# Patient Record
Sex: FEMALE | Race: WHITE | NOT HISPANIC OR LATINO | URBAN - NONMETROPOLITAN AREA
[De-identification: names, ages, dates, MRNs, and addresses within clinical notes are randomized per-mention and may not be internally consistent; named-entity substitution may affect disease eponyms.]

---

## 2019-04-02 ENCOUNTER — IMPORTED ENCOUNTER (OUTPATIENT)
Dept: URBAN - NONMETROPOLITAN AREA CLINIC 1 | Facility: CLINIC | Age: 29
End: 2019-04-02

## 2019-04-02 PROBLEM — H52.223: Noted: 2019-04-02

## 2019-04-02 PROBLEM — H10.413: Noted: 2019-04-02

## 2019-04-02 PROCEDURE — 92004 COMPRE OPH EXAM NEW PT 1/>: CPT

## 2019-04-02 PROCEDURE — 92015 DETERMINE REFRACTIVE STATE: CPT

## 2019-04-02 NOTE — PATIENT DISCUSSION
Asigmatism / Estel Мария / Nikita Caputo - Discussed diagnosis in detail with patient - New glasses Rx given today- Optos done today WNL OU - Continue to monitor- RTC 1 year complete Allergies OU- Discussed diagnosis in detail with patient- Start Pataday BID OU Rx sent to pharmacy - Continue to monitor

## 2021-05-28 NOTE — PATIENT DISCUSSION
New Prescription: erythromycin (erythromycin): ointment: 5 mg/gram (0.5 %) a small amount three times a day into left eye 05-

## 2021-06-01 NOTE — PATIENT DISCUSSION
Stopped Today: erythromycin (erythromycin): ointment: 5 mg/gram (0.5 %) a small amount three times a day into left eye 05-

## 2021-06-01 NOTE — PATIENT DISCUSSION
CORNEAL FOREIGN BODY OS: EDUCATED PATIENT ON FINDINGS. GREATLY IMPROVED TODAY WITH PINPOINT EPI DEFECT AND MILD RUST RING REMAINING. OK TO DISCONTINUE ERYTHROMYCIN CLAUDIA 2/2 PATIENT IRRITATION. CONTINUE OFLOXACIN QID OS AND PF ARTIFICIAL TEARS QID/PRN OS. DISCUSSED MONITORING RUST RING TO BECOME MORE SUPERFICIAL; PATIENT EXPRESSED UNDERSTANDING. RTC 2 DAYS FOR FOLLOW-UP OR SOONER WITH ANY WORSENING OF SI/SX. MONITOR.

## 2021-06-03 NOTE — PATIENT DISCUSSION
CORNEAL FOREIGN BODY OS: SUPERFICIAL RUST RING REMOVED IN OFFICE TODAY WITH DAFNE BRUSH WITHOUT COMPLICATIONS. SMALL EPI DEFECT REMAINS. CONTINUE OFLOXACIN QID OS (REFILLED TODAY) AND PF ARTIFICIAL TEARS QID/PRN OS. RTC 4-5 DAYS FOR FOLLOW-UP OR SOONER WITH ANY WORSENING OF SI/SX. MONITOR.

## 2021-06-08 NOTE — PATIENT DISCUSSION
CORNEAL FOREIGN BODY OS: EDUCATED PATIENT ON FINDINGS. RESOLVED INTO STROMAL SCAR TODAY WITHOUT EPI DEFECT/RUST RING. OK TO DISCONTINUE OFLOXACIN. CONTINUE ARTIFICIAL TEARS BID/PRN OU. ADVISED TO RTC IF SI/SX RETURN. MONITOR.

## 2022-04-09 ASSESSMENT — TONOMETRY
OD_IOP_MMHG: 13
OS_IOP_MMHG: 14

## 2022-04-09 ASSESSMENT — VISUAL ACUITY
OU_SC: 20/20
OD_CC: 20/30
OS_CC: 20/30-

## 2022-11-16 NOTE — PATIENT DISCUSSION
Educated patient on findings. Small FB removed at slit lamp in office today without complications. Small epi defect remains. Prescribe ofloxacin QID OS x 3 days then discontinue. Continue preservative free artificial tears QID/prn. Advised to call/RTC if si/sx persist or worsen. Monitor.